# Patient Record
(demographics unavailable — no encounter records)

---

## 2024-11-27 NOTE — HISTORY OF PRESENT ILLNESS
[Awakes with Dry Mouth] : awakes with dry mouth [Awakes with Headache] : awakes with headache [Recent  Weight Gain] : recent weight gain [Snoring] : snoring [Unintentional Sleep while Inactive] : unintentional sleep while inactive

## 2024-11-27 NOTE — REASON FOR VISIT
[Initial] : an initial visit [Sleep Evaluation] : sleep evaluation [Asthma] : asthma [Sleep Apnea] : sleep apnea

## 2024-12-26 NOTE — HISTORY OF PRESENT ILLNESS
[TextBox_4] : Telehealth follow-up for asthma and sleep apnea Overall feeling much better since being on asthma medication Wants to review sleep study and sinus CT

## 2024-12-26 NOTE — PROCEDURE
[FreeTextEntry1] : Home sleep study demonstrates moderate SOILA AHI 17 events per hour  Sinus CT demonstrates extensive sinus disease pansinusitis and polyposis

## 2024-12-26 NOTE — ASSESSMENT
[FreeTextEntry1] : Asthma with pansinusitis.  Has been offered surgical management but would consider Dupixent asked patient to follow-up with ENT regarding this  In terms of sleep apnea recommend treatment with auto CPAP.  In light of extensive sinus disease would suggest fullface mask and this was ordered  Follow-up for compliance assessment telehealth visit 1 week after starting

## 2025-01-23 NOTE — HISTORY OF PRESENT ILLNESS
[TextBox_4] : Telehealth visit for sleep apnea.  Received auto CPAP 1 week ago.  Having some difficulty tolerating it.  Currently has fullface mask but would like to change the nasal pillow.  He is starting on Dupixent from ENT.

## 2025-01-23 NOTE — REASON FOR VISIT
[Home] : at home, [unfilled] , at the time of the visit. [Medical Office: ([facility])___] : at the medical office located in

## 2025-01-23 NOTE — ASSESSMENT
[FreeTextEntry1] : Will change to nasal pillow mask ENT follow-up for pansinusitis and will reassess asthma at office visit once on Dupixent